# Patient Record
Sex: MALE | Race: WHITE | NOT HISPANIC OR LATINO | Employment: FULL TIME | URBAN - NONMETROPOLITAN AREA
[De-identification: names, ages, dates, MRNs, and addresses within clinical notes are randomized per-mention and may not be internally consistent; named-entity substitution may affect disease eponyms.]

---

## 2023-05-03 ENCOUNTER — APPOINTMENT (OUTPATIENT)
Dept: PRIMARY CARE | Facility: CLINIC | Age: 53
End: 2023-05-03

## 2023-05-03 PROCEDURE — FAA03 PR FAA EXAM SPECIAL ISSUANCE: Performed by: FAMILY MEDICINE

## 2023-05-03 PROCEDURE — FAA01 PR FAA1 EXAM CLASS I II III: Performed by: FAMILY MEDICINE

## 2023-10-02 ENCOUNTER — HOSPITAL ENCOUNTER (OUTPATIENT)
Dept: RADIOLOGY | Facility: HOSPITAL | Age: 53
Discharge: HOME | End: 2023-10-02
Payer: COMMERCIAL

## 2023-10-02 DIAGNOSIS — Z87.442 PERSONAL HISTORY OF URINARY CALCULI: ICD-10-CM

## 2023-10-02 PROCEDURE — 74018 RADEX ABDOMEN 1 VIEW: CPT | Mod: FY

## 2023-10-02 PROCEDURE — 74018 RADEX ABDOMEN 1 VIEW: CPT

## 2023-10-11 ENCOUNTER — LAB (OUTPATIENT)
Dept: LAB | Facility: LAB | Age: 53
End: 2023-10-11
Payer: COMMERCIAL

## 2023-10-11 DIAGNOSIS — N40.0 BENIGN PROSTATIC HYPERPLASIA WITHOUT LOWER URINARY TRACT SYMPTOMS: Primary | ICD-10-CM

## 2023-10-11 LAB — PSA SERPL-MCNC: 0.25 NG/ML

## 2023-10-11 PROCEDURE — 36415 COLL VENOUS BLD VENIPUNCTURE: CPT

## 2023-10-11 PROCEDURE — 84153 ASSAY OF PSA TOTAL: CPT

## 2023-11-08 ENCOUNTER — APPOINTMENT (OUTPATIENT)
Dept: PRIMARY CARE | Facility: CLINIC | Age: 53
End: 2023-11-08

## 2023-11-08 PROCEDURE — FAA03 PR FAA EXAM SPECIAL ISSUANCE: Performed by: FAMILY MEDICINE

## 2023-11-08 PROCEDURE — FAA01 PR FAA1 EXAM CLASS I II III: Performed by: FAMILY MEDICINE

## 2023-11-08 PROCEDURE — FAAEKG PR FAA EXAM EKG COMPONENT: Performed by: FAMILY MEDICINE

## 2023-11-25 DIAGNOSIS — D69.3 IDIOPATHIC THROMBOCYTOPENIC PURPURA (MULTI): Primary | ICD-10-CM

## 2023-11-25 NOTE — PROGRESS NOTES
Mahesh Deras is a 53 y.o. year old male patient who had a patient care encounter with Dr. Fuentes on 9/11/23 for ongoing management of Idiopathic thrombocytopenic purpura (CMS/HCC) [D69.3] .  Mahesh is currently being treated with eltrombopag.    Labs from 9/11/23:  WBC 5.1, ANC 2.83, H/H 16.7/49.0, PLTs 311;  CMP stable.  No evidence of lymphoma relapse.  PLT count normal.  Continue current therapy.    Per collaborative practice agreement with Dr. Fuentes, on 11/25/23 I refilled eltrombopag 50 mg PO once daily for 5 days per week, then off 2 days;  35 day continuous cycle.  Qty # 30 with 2 refills.  The prescription was sent to Dr. Fuentes for approval, pending subsequent electronic transmission to [facility].    Next FUV is 3/11/24.      Matthew Spann, [facility], [place], BCOP  Clinical Pharmacist Specialist - Ambulatory Oncology

## 2024-01-10 ENCOUNTER — SPECIALTY PHARMACY (OUTPATIENT)
Dept: PHARMACY | Facility: CLINIC | Age: 54
End: 2024-01-10

## 2024-01-12 ENCOUNTER — TELEPHONE (OUTPATIENT)
Dept: HEMATOLOGY/ONCOLOGY | Facility: CLINIC | Age: 54
End: 2024-01-12
Payer: COMMERCIAL

## 2024-03-11 ENCOUNTER — OFFICE VISIT (OUTPATIENT)
Dept: HEMATOLOGY/ONCOLOGY | Facility: CLINIC | Age: 54
End: 2024-03-11
Payer: COMMERCIAL

## 2024-03-11 ENCOUNTER — APPOINTMENT (OUTPATIENT)
Dept: HEMATOLOGY/ONCOLOGY | Facility: CLINIC | Age: 54
End: 2024-03-11
Payer: COMMERCIAL

## 2024-03-11 ENCOUNTER — LAB (OUTPATIENT)
Dept: LAB | Facility: CLINIC | Age: 54
End: 2024-03-11
Payer: COMMERCIAL

## 2024-03-11 VITALS
BODY MASS INDEX: 24.64 KG/M2 | WEIGHT: 165.79 LBS | SYSTOLIC BLOOD PRESSURE: 132 MMHG | RESPIRATION RATE: 16 BRPM | TEMPERATURE: 97.5 F | HEART RATE: 83 BPM | OXYGEN SATURATION: 96 % | DIASTOLIC BLOOD PRESSURE: 81 MMHG

## 2024-03-11 DIAGNOSIS — D69.3 IDIOPATHIC THROMBOCYTOPENIC PURPURA (MULTI): ICD-10-CM

## 2024-03-11 DIAGNOSIS — C83.30 DIFFUSE LARGE B-CELL LYMPHOMA, UNSPECIFIED BODY REGION (MULTI): ICD-10-CM

## 2024-03-11 LAB
ALBUMIN SERPL BCP-MCNC: 5 G/DL (ref 3.4–5)
ALP SERPL-CCNC: 80 U/L (ref 33–120)
ALT SERPL W P-5'-P-CCNC: 26 U/L (ref 10–52)
ANION GAP SERPL CALC-SCNC: 14 MMOL/L (ref 10–20)
AST SERPL W P-5'-P-CCNC: 18 U/L (ref 9–39)
BASOPHILS # BLD AUTO: 0.04 X10*3/UL (ref 0–0.1)
BASOPHILS NFR BLD AUTO: 0.7 %
BILIRUB SERPL-MCNC: 0.8 MG/DL (ref 0–1.2)
BUN SERPL-MCNC: 14 MG/DL (ref 6–23)
CALCIUM SERPL-MCNC: 9.9 MG/DL (ref 8.6–10.3)
CHLORIDE SERPL-SCNC: 103 MMOL/L (ref 98–107)
CO2 SERPL-SCNC: 30 MMOL/L (ref 21–32)
CREAT SERPL-MCNC: 0.92 MG/DL (ref 0.5–1.3)
EGFRCR SERPLBLD CKD-EPI 2021: >90 ML/MIN/1.73M*2
EOSINOPHIL # BLD AUTO: 0.13 X10*3/UL (ref 0–0.7)
EOSINOPHIL NFR BLD AUTO: 2.2 %
ERYTHROCYTE [DISTWIDTH] IN BLOOD BY AUTOMATED COUNT: 12.4 % (ref 11.5–14.5)
GLUCOSE SERPL-MCNC: 102 MG/DL (ref 74–99)
HCT VFR BLD AUTO: 50.6 % (ref 41–52)
HGB BLD-MCNC: 17 G/DL (ref 13.5–17.5)
IMM GRANULOCYTES # BLD AUTO: 0.01 X10*3/UL (ref 0–0.7)
IMM GRANULOCYTES NFR BLD AUTO: 0.2 % (ref 0–0.9)
LDH SERPL L TO P-CCNC: 160 U/L (ref 84–246)
LYMPHOCYTES # BLD AUTO: 1.52 X10*3/UL (ref 1.2–4.8)
LYMPHOCYTES NFR BLD AUTO: 25.9 %
MCH RBC QN AUTO: 30.1 PG (ref 26–34)
MCHC RBC AUTO-ENTMCNC: 33.6 G/DL (ref 32–36)
MCV RBC AUTO: 90 FL (ref 80–100)
MONOCYTES # BLD AUTO: 0.59 X10*3/UL (ref 0.1–1)
MONOCYTES NFR BLD AUTO: 10.1 %
NEUTROPHILS # BLD AUTO: 3.57 X10*3/UL (ref 1.2–7.7)
NEUTROPHILS NFR BLD AUTO: 60.9 %
PLATELET # BLD AUTO: 359 X10*3/UL (ref 150–450)
POTASSIUM SERPL-SCNC: 4 MMOL/L (ref 3.5–5.3)
PROT SERPL-MCNC: 7.3 G/DL (ref 6.4–8.2)
RBC # BLD AUTO: 5.64 X10*6/UL (ref 4.5–5.9)
SODIUM SERPL-SCNC: 143 MMOL/L (ref 136–145)
WBC # BLD AUTO: 5.9 X10*3/UL (ref 4.4–11.3)

## 2024-03-11 PROCEDURE — 99214 OFFICE O/P EST MOD 30 MIN: CPT | Performed by: INTERNAL MEDICINE

## 2024-03-11 PROCEDURE — 80053 COMPREHEN METABOLIC PANEL: CPT

## 2024-03-11 PROCEDURE — 36415 COLL VENOUS BLD VENIPUNCTURE: CPT

## 2024-03-11 PROCEDURE — 83615 LACTATE (LD) (LDH) ENZYME: CPT

## 2024-03-11 PROCEDURE — 85025 COMPLETE CBC W/AUTO DIFF WBC: CPT

## 2024-03-11 ASSESSMENT — PAIN SCALES - GENERAL: PAINLEVEL: 0-NO PAIN

## 2024-03-11 NOTE — PROGRESS NOTES
Mahesh Deras is a 53 y.o. year old male patient who had a patient care encounter with Dr. Fuentes on 3/11/24 for ongoing management of his ITP and his B cell lymphoma (in remission).  Keon is currently being treated with eltrombopag for his ITP.    Results from last 7 days   Lab Units 03/11/24  0801   WBC AUTO x10*3/uL 5.9   HEMOGLOBIN g/dL 17.0   HEMATOCRIT % 50.6   PLATELETS AUTO x10*3/uL 359   NEUTROS PCT AUTO % 60.9   LYMPHS PCT AUTO % 25.9   MONOS PCT AUTO % 10.1   EOS PCT AUTO % 2.2      Results from last 7 days   Lab Units 03/11/24  0801   SODIUM mmol/L 143   POTASSIUM mmol/L 4.0   CHLORIDE mmol/L 103   CO2 mmol/L 30   BUN mg/dL 14   CREATININE mg/dL 0.92   CALCIUM mg/dL 9.9   PROTEIN TOTAL g/dL 7.3   BILIRUBIN TOTAL mg/dL 0.8   ALK PHOS U/L 80   ALT U/L 26   AST U/L 18   GLUCOSE mg/dL 102*      Labs from 3/11/24:  WBC 5.9, ANC 3.57, H/H 17.0/50.6, PLTs 359;  CMP stable;  .  Per RC, LFTs stable.      Per Dr. Fuentes, continue eltrombopag as is, no changes, in order to maintain PLTs in current range.    Per collaborative practice agreement with Dr. Fuentes, a little early on 3/11/24 I refilled eltrombopag 50 mg PO once daily for 5 days a week, off for 2 days per week, 42-day continuous cycle.  Qty # 30 with 4 refills.  The prescription was sent electronically to [facility].    RTC in 6 months.      Matthew Spann, [facility], MS, BCOP  Clinical Pharmacist Specialist - Ambulatory Oncology

## 2024-03-11 NOTE — PROGRESS NOTES
Patient ID: Mahesh Deras is a 53 y.o. male.  Referring Physician: No referring provider defined for this encounter.  Primary Care Provider: Ankush Gallegos MD  Visit Type: Follow Up      Subjective    HPI How was my bloodwork?    Review of Systems   Constitutional: Negative.    HENT:  Negative.     Eyes: Negative.    Respiratory: Negative.     Cardiovascular: Negative.    Gastrointestinal: Negative.    Endocrine: Negative.    Genitourinary: Negative.     Musculoskeletal: Negative.    Skin: Negative.    Neurological: Negative.    Hematological: Negative.    Psychiatric/Behavioral: Negative.          Objective   BSA: 1.91 meters squared  /81 (BP Location: Right arm)   Pulse 83   Temp 36.4 °C (97.5 °F) (Temporal)   Resp 16   Wt 75.2 kg (165 lb 12.6 oz)   SpO2 96%   BMI 24.64 kg/m²      has no past medical history on file.   has a past surgical history that includes Other surgical history (11/23/2021) and Other surgical history (11/23/2021).  No family history on file.  Oncology History    No history exists.       Mahesh Deras  has no history on file for tobacco use.  He  has no history on file for alcohol use.  He  has no history on file for drug use.    Physical Exam  Vitals reviewed.   Constitutional:       Appearance: Normal appearance.   HENT:      Head: Normocephalic.      Mouth/Throat:      Mouth: Mucous membranes are moist.   Eyes:      Extraocular Movements: Extraocular movements intact.      Pupils: Pupils are equal, round, and reactive to light.   Cardiovascular:      Rate and Rhythm: Normal rate and regular rhythm.      Heart sounds: Normal heart sounds.   Pulmonary:      Breath sounds: Normal breath sounds.   Abdominal:      General: Bowel sounds are normal.      Palpations: Abdomen is soft.   Musculoskeletal:         General: Normal range of motion.      Cervical back: Normal range of motion and neck supple.   Skin:     General: Skin is warm.   Neurological:      General: No focal  deficit present.      Mental Status: He is alert and oriented to person, place, and time.   Psychiatric:         Mood and Affect: Mood normal.         Behavior: Behavior normal.         WBC   Date/Time Value Ref Range Status   03/11/2024 08:01 AM 5.9 4.4 - 11.3 x10*3/uL Final   09/11/2023 09:34 AM 5.1 4.4 - 11.3 x10E9/L Final   03/06/2023 09:34 AM 5.0 4.4 - 11.3 x10E9/L Final   01/16/2023 09:07 AM 5.1 4.4 - 11.3 x10E9/L Final     nRBC   Date Value Ref Range Status   09/13/2021 0.0 0.0 - 0.0 /100 WBC Final   09/12/2021 0.0 0.0 - 0.0 /100 WBC Final   09/11/2021 0.0 0.0 - 0.0 /100 WBC Final     RBC   Date Value Ref Range Status   03/11/2024 5.64 4.50 - 5.90 x10*6/uL Final   09/11/2023 5.47 4.50 - 5.90 x10E12/L Final   03/06/2023 5.51 4.50 - 5.90 x10E12/L Final   01/16/2023 5.38 4.50 - 5.90 x10E12/L Final     Hemoglobin   Date Value Ref Range Status   03/11/2024 17.0 13.5 - 17.5 g/dL Final   09/11/2023 16.7 13.5 - 17.5 g/dL Final   03/06/2023 16.7 13.5 - 17.5 g/dL Final   01/16/2023 16.4 13.5 - 17.5 g/dL Final     Hematocrit   Date Value Ref Range Status   03/11/2024 50.6 41.0 - 52.0 % Final   09/11/2023 49.0 41.0 - 52.0 % Final   03/06/2023 49.7 41.0 - 52.0 % Final   01/16/2023 48.2 41.0 - 52.0 % Final     MCV   Date/Time Value Ref Range Status   03/11/2024 08:01 AM 90 80 - 100 fL Final   09/11/2023 09:34 AM 90 80 - 100 fL Final   03/06/2023 09:34 AM 90 80 - 100 fL Final   01/16/2023 09:07 AM 90 80 - 100 fL Final     MCH   Date/Time Value Ref Range Status   03/11/2024 08:01 AM 30.1 26.0 - 34.0 pg Final     MCHC   Date/Time Value Ref Range Status   03/11/2024 08:01 AM 33.6 32.0 - 36.0 g/dL Final   09/11/2023 09:34 AM 34.1 32.0 - 36.0 g/dL Final   03/06/2023 09:34 AM 33.6 32.0 - 36.0 g/dL Final   01/16/2023 09:07 AM 34.0 32.0 - 36.0 g/dL Final     RDW   Date/Time Value Ref Range Status   03/11/2024 08:01 AM 12.4 11.5 - 14.5 % Final   09/11/2023 09:34 AM 12.2 11.5 - 14.5 % Final   03/06/2023 09:34 AM 12.5 11.5 - 14.5 %  "Final   01/16/2023 09:07 AM 12.5 11.5 - 14.5 % Final     Platelets   Date/Time Value Ref Range Status   03/11/2024 08:01  150 - 450 x10*3/uL Final   09/11/2023 09:34  150 - 450 x10E9/L Final   03/06/2023 09:34  150 - 450 x10E9/L Final   01/16/2023 09:07  150 - 450 x10E9/L Final     No results found for: \"MPV\"  Neutrophils %   Date/Time Value Ref Range Status   03/11/2024 08:01 AM 60.9 40.0 - 80.0 % Final   09/11/2023 09:34 AM 55.6 40.0 - 80.0 % Final   03/06/2023 09:34 AM 55.9 40.0 - 80.0 % Final   01/16/2023 09:07 AM 59.0 40.0 - 80.0 % Final     Immature Granulocytes %, Automated   Date/Time Value Ref Range Status   03/11/2024 08:01 AM 0.2 0.0 - 0.9 % Final     Comment:     Immature Granulocyte Count (IG) includes promyelocytes, myelocytes and metamyelocytes but does not include bands. Percent differential counts (%) should be interpreted in the context of the absolute cell counts (cells/UL).   09/11/2023 09:34 AM 0.0 0.0 - 0.9 % Final     Comment:      Immature Granulocyte Count (IG) includes promyelocytes,    myelocytes and metamyelocytes but does not include bands.   Percent differential counts (%) should be interpreted in the   context of the absolute cell counts (cells/L).     03/06/2023 09:34 AM 0.2 0.0 - 0.9 % Final     Comment:      Immature Granulocyte Count (IG) includes promyelocytes,    myelocytes and metamyelocytes but does not include bands.   Percent differential counts (%) should be interpreted in the   context of the absolute cell counts (cells/L).     01/16/2023 09:07 AM 0.2 0.0 - 0.9 % Final     Comment:      Immature Granulocyte Count (IG) includes promyelocytes,    myelocytes and metamyelocytes but does not include bands.   Percent differential counts (%) should be interpreted in the   context of the absolute cell counts (cells/L).       Lymphocytes %   Date/Time Value Ref Range Status   03/11/2024 08:01 AM 25.9 13.0 - 44.0 % Final   09/11/2023 09:34 AM 30.1 13.0 - 44.0 " % Final   03/06/2023 09:34 AM 31.2 13.0 - 44.0 % Final   01/16/2023 09:07 AM 26.2 13.0 - 44.0 % Final   09/09/2021 11:37 PM 13.0 13.0 - 44.0 % Final     Monocytes %   Date/Time Value Ref Range Status   03/11/2024 08:01 AM 10.1 2.0 - 10.0 % Final   09/11/2023 09:34 AM 10.8 2.0 - 10.0 % Final   03/06/2023 09:34 AM 8.7 2.0 - 10.0 % Final   01/16/2023 09:07 AM 11.2 2.0 - 10.0 % Final   09/09/2021 11:37 PM 13.0 2.0 - 10.0 % Final     Eosinophils %   Date/Time Value Ref Range Status   03/11/2024 08:01 AM 2.2 0.0 - 6.0 % Final   09/11/2023 09:34 AM 2.9 0.0 - 6.0 % Final   03/06/2023 09:34 AM 3.4 0.0 - 6.0 % Final   01/16/2023 09:07 AM 2.6 0.0 - 6.0 % Final   09/09/2021 11:37 PM 1.0 0.0 - 6.0 % Final     Basophils %   Date/Time Value Ref Range Status   03/11/2024 08:01 AM 0.7 0.0 - 2.0 % Final   09/11/2023 09:34 AM 0.6 0.0 - 2.0 % Final   03/06/2023 09:34 AM 0.6 0.0 - 2.0 % Final   01/16/2023 09:07 AM 0.8 0.0 - 2.0 % Final   09/09/2021 11:37 PM 0.0 0.0 - 2.0 % Final     Neutrophils Absolute   Date/Time Value Ref Range Status   03/11/2024 08:01 AM 3.57 1.20 - 7.70 x10*3/uL Final     Comment:     Percent differential counts (%) should be interpreted in the context of the absolute cell counts (cells/uL).   09/11/2023 09:34 AM 2.83 1.20 - 7.70 x10E9/L Final   03/06/2023 09:34 AM 2.81 1.20 - 7.70 x10E9/L Final   01/16/2023 09:07 AM 2.99 1.20 - 7.70 x10E9/L Final     Immature Granulocytes Absolute, Automated   Date/Time Value Ref Range Status   03/11/2024 08:01 AM 0.01 0.00 - 0.70 x10*3/uL Final     Lymphocytes Absolute   Date/Time Value Ref Range Status   03/11/2024 08:01 AM 1.52 1.20 - 4.80 x10*3/uL Final   09/11/2023 09:34 AM 1.53 1.20 - 4.80 x10E9/L Final   03/06/2023 09:34 AM 1.57 1.20 - 4.80 x10E9/L Final   01/16/2023 09:07 AM 1.33 1.20 - 4.80 x10E9/L Final     Monocytes Absolute   Date/Time Value Ref Range Status   03/11/2024 08:01 AM 0.59 0.10 - 1.00 x10*3/uL Final   09/11/2023 09:34 AM 0.55 0.10 - 1.00 x10E9/L Final  "  03/06/2023 09:34 AM 0.44 0.10 - 1.00 x10E9/L Final   01/16/2023 09:07 AM 0.57 0.10 - 1.00 x10E9/L Final     Eosinophils Absolute   Date/Time Value Ref Range Status   03/11/2024 08:01 AM 0.13 0.00 - 0.70 x10*3/uL Final   09/11/2023 09:34 AM 0.15 0.00 - 0.70 x10E9/L Final   03/06/2023 09:34 AM 0.17 0.00 - 0.70 x10E9/L Final   01/16/2023 09:07 AM 0.13 0.00 - 0.70 x10E9/L Final   09/09/2021 11:37 PM 0.08 0.00 - 0.70 x10E9/L Final     Basophils Absolute   Date/Time Value Ref Range Status   03/11/2024 08:01 AM 0.04 0.00 - 0.10 x10*3/uL Final   09/11/2023 09:34 AM 0.03 0.00 - 0.10 x10E9/L Final   03/06/2023 09:34 AM 0.03 0.00 - 0.10 x10E9/L Final   01/16/2023 09:07 AM 0.04 0.00 - 0.10 x10E9/L Final   09/09/2021 11:37 PM 0.00 0.00 - 0.10 x10E9/L Final       No components found for: \"PT\"  aPTT   Date/Time Value Ref Range Status   09/11/2021 06:39 AM 28 25 - 35 sec Final     Comment:       THE APTT IS NO LONGER USED FOR MONITORING     UNFRACTIONATED HEPARIN THERAPY.    FOR MONITORING HEPARIN THERAPY,     USE THE HEPARIN ASSAY.     09/10/2021 06:50 AM 32 25 - 35 sec Final     Comment:       THE APTT IS NO LONGER USED FOR MONITORING     UNFRACTIONATED HEPARIN THERAPY.    FOR MONITORING HEPARIN THERAPY,     USE THE HEPARIN ASSAY.       Medication Documentation Review Audit       Reviewed by Lona Cortez MA (Medical Assistant) on 03/11/24 at 1535      Medication Order Taking? Sig Documenting Provider Last Dose Status   eltrombopag (Promacta) 50 mg tablet [id number]  Take 1 table by mouth once daily for 5 days a week on an empty stomach.  Then off for 2 days a week as directed. 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9, hgb 17, hematocrit 50.6, ANC 3570, creatinine 0.92, calcium 9.9, AST 18, ALT 26, LDH  -will continue to follow him Q6 months     Problem List Items Addressed This Visit    None  Visit Diagnoses         Codes    Diffuse large B-cell lymphoma, unspecified body region (CMS/HCC)     C83.30    Relevant Orders    CBC and Auto Differential (Completed)    Comprehensive Metabolic Panel (Completed)    Lactate dehydrogenase (Completed)    Clinic Appointment Request Follow Up; DUNG FUENTES; [facility]    CBC and Auto Differential    Comprehensive metabolic panel    Lactate dehydrogenase    Idiopathic thrombocytopenic purpura (CMS/HCC)     D69.3    Relevant Medications    eltrombopag olamine (Promacta) 50 mg tablet                 Dung Fuentes MD

## 2024-03-19 PROBLEM — C83.30 DIFFUSE LARGE B-CELL LYMPHOMA (MULTI): Status: ACTIVE | Noted: 2024-03-19

## 2024-03-19 PROBLEM — D69.3 IDIOPATHIC THROMBOCYTOPENIC PURPURA (MULTI): Status: ACTIVE | Noted: 2024-03-19

## 2024-03-19 ASSESSMENT — ENCOUNTER SYMPTOMS
PSYCHIATRIC NEGATIVE: 1
RESPIRATORY NEGATIVE: 1
ENDOCRINE NEGATIVE: 1
CONSTITUTIONAL NEGATIVE: 1
NEUROLOGICAL NEGATIVE: 1
HEMATOLOGIC/LYMPHATIC NEGATIVE: 1
CARDIOVASCULAR NEGATIVE: 1
MUSCULOSKELETAL NEGATIVE: 1
EYES NEGATIVE: 1
GASTROINTESTINAL NEGATIVE: 1

## 2024-04-02 ENCOUNTER — HOSPITAL ENCOUNTER (OUTPATIENT)
Dept: RADIOLOGY | Facility: HOSPITAL | Age: 54
Discharge: HOME | End: 2024-04-02
Payer: COMMERCIAL

## 2024-04-02 DIAGNOSIS — N20.0 CALCULUS OF KIDNEY: ICD-10-CM

## 2024-04-02 PROCEDURE — 74018 RADEX ABDOMEN 1 VIEW: CPT

## 2024-04-02 PROCEDURE — 74018 RADEX ABDOMEN 1 VIEW: CPT | Performed by: STUDENT IN AN ORGANIZED HEALTH CARE EDUCATION/TRAINING PROGRAM

## 2024-05-08 ENCOUNTER — APPOINTMENT (OUTPATIENT)
Dept: PRIMARY CARE | Facility: CLINIC | Age: 54
End: 2024-05-08

## 2024-05-09 ENCOUNTER — DOCUMENTATION (OUTPATIENT)
Dept: PRIMARY CARE | Facility: CLINIC | Age: 54
End: 2024-05-09

## 2024-05-09 PROCEDURE — FAA01 PR FAA1 EXAM CLASS I II III: Performed by: FAMILY MEDICINE

## 2024-05-09 PROCEDURE — FAA03 PR FAA EXAM SPECIAL ISSUANCE: Performed by: FAMILY MEDICINE

## 2024-05-10 ENCOUNTER — TELEPHONE (OUTPATIENT)
Dept: PRIMARY CARE | Facility: CLINIC | Age: 54
End: 2024-05-10
Payer: COMMERCIAL

## 2024-05-10 NOTE — TELEPHONE ENCOUNTER
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

## 2024-09-13 ENCOUNTER — LAB (OUTPATIENT)
Dept: LAB | Facility: CLINIC | Age: 54
End: 2024-09-13
Payer: COMMERCIAL

## 2024-09-13 DIAGNOSIS — C83.30 DIFFUSE LARGE B-CELL LYMPHOMA, UNSPECIFIED BODY REGION (MULTI): ICD-10-CM

## 2024-09-13 LAB
ALBUMIN SERPL BCP-MCNC: 4.8 G/DL (ref 3.4–5)
ALP SERPL-CCNC: 76 U/L (ref 33–120)
ALT SERPL W P-5'-P-CCNC: 22 U/L (ref 10–52)
ANION GAP SERPL CALC-SCNC: 11 MMOL/L (ref 10–20)
AST SERPL W P-5'-P-CCNC: 20 U/L (ref 9–39)
BASOPHILS # BLD AUTO: 0.03 X10*3/UL (ref 0–0.1)
BASOPHILS NFR BLD AUTO: 0.6 %
BILIRUB SERPL-MCNC: 0.6 MG/DL (ref 0–1.2)
BUN SERPL-MCNC: 15 MG/DL (ref 6–23)
CALCIUM SERPL-MCNC: 9.5 MG/DL (ref 8.6–10.3)
CHLORIDE SERPL-SCNC: 104 MMOL/L (ref 98–107)
CO2 SERPL-SCNC: 31 MMOL/L (ref 21–32)
CREAT SERPL-MCNC: 0.9 MG/DL (ref 0.5–1.3)
EGFRCR SERPLBLD CKD-EPI 2021: >90 ML/MIN/1.73M*2
EOSINOPHIL # BLD AUTO: 0.16 X10*3/UL (ref 0–0.7)
EOSINOPHIL NFR BLD AUTO: 3 %
ERYTHROCYTE [DISTWIDTH] IN BLOOD BY AUTOMATED COUNT: 12.3 % (ref 11.5–14.5)
GLUCOSE SERPL-MCNC: 86 MG/DL (ref 74–99)
HCT VFR BLD AUTO: 47.8 % (ref 41–52)
HGB BLD-MCNC: 16 G/DL (ref 13.5–17.5)
IMM GRANULOCYTES # BLD AUTO: 0.01 X10*3/UL (ref 0–0.7)
IMM GRANULOCYTES NFR BLD AUTO: 0.2 % (ref 0–0.9)
LDH SERPL L TO P-CCNC: 141 U/L (ref 84–246)
LYMPHOCYTES # BLD AUTO: 1.37 X10*3/UL (ref 1.2–4.8)
LYMPHOCYTES NFR BLD AUTO: 25.6 %
MCH RBC QN AUTO: 29.9 PG (ref 26–34)
MCHC RBC AUTO-ENTMCNC: 33.5 G/DL (ref 32–36)
MCV RBC AUTO: 89 FL (ref 80–100)
MONOCYTES # BLD AUTO: 0.64 X10*3/UL (ref 0.1–1)
MONOCYTES NFR BLD AUTO: 12 %
NEUTROPHILS # BLD AUTO: 3.14 X10*3/UL (ref 1.2–7.7)
NEUTROPHILS NFR BLD AUTO: 58.6 %
PLATELET # BLD AUTO: 355 X10*3/UL (ref 150–450)
POTASSIUM SERPL-SCNC: 4.1 MMOL/L (ref 3.5–5.3)
PROT SERPL-MCNC: 6.8 G/DL (ref 6.4–8.2)
RBC # BLD AUTO: 5.35 X10*6/UL (ref 4.5–5.9)
SODIUM SERPL-SCNC: 142 MMOL/L (ref 136–145)
WBC # BLD AUTO: 5.4 X10*3/UL (ref 4.4–11.3)

## 2024-09-13 PROCEDURE — 85025 COMPLETE CBC W/AUTO DIFF WBC: CPT

## 2024-09-13 PROCEDURE — 36415 COLL VENOUS BLD VENIPUNCTURE: CPT

## 2024-09-13 PROCEDURE — 83615 LACTATE (LD) (LDH) ENZYME: CPT

## 2024-09-13 PROCEDURE — 80053 COMPREHEN METABOLIC PANEL: CPT

## 2024-09-18 ENCOUNTER — OFFICE VISIT (OUTPATIENT)
Dept: HEMATOLOGY/ONCOLOGY | Facility: CLINIC | Age: 54
End: 2024-09-18
Payer: COMMERCIAL

## 2024-09-18 VITALS
OXYGEN SATURATION: 98 % | DIASTOLIC BLOOD PRESSURE: 88 MMHG | HEART RATE: 76 BPM | TEMPERATURE: 97.5 F | WEIGHT: 167.99 LBS | RESPIRATION RATE: 16 BRPM | BODY MASS INDEX: 24.97 KG/M2 | SYSTOLIC BLOOD PRESSURE: 144 MMHG

## 2024-09-18 DIAGNOSIS — C83.30 DIFFUSE LARGE B-CELL LYMPHOMA, UNSPECIFIED BODY REGION (MULTI): ICD-10-CM

## 2024-09-18 DIAGNOSIS — D69.3 IDIOPATHIC THROMBOCYTOPENIC PURPURA (MULTI): Primary | ICD-10-CM

## 2024-09-18 PROCEDURE — 99214 OFFICE O/P EST MOD 30 MIN: CPT | Performed by: INTERNAL MEDICINE

## 2024-09-18 ASSESSMENT — PAIN SCALES - GENERAL: PAINLEVEL: 0-NO PAIN

## 2024-09-18 NOTE — PROGRESS NOTES
Patient ID: Mahesh Deras is a 53 y.o. male.  Referring Physician: Dung Fuentes MD  [id number]  [address]  [place] [address]  Primary Care Provider: Ankush Gallegos MD  Visit Type: Follow Up      Subjective    HPI I am doing okay    Review of Systems   Constitutional: Negative.    HENT:  Negative.     Eyes: Negative.    Respiratory: Negative.     Cardiovascular: Negative.    Gastrointestinal: Negative.    Endocrine: Negative.    Genitourinary: Negative.     Musculoskeletal: Negative.    Skin: Negative.    Neurological: Negative.    Hematological: Negative.    Psychiatric/Behavioral: Negative.          Objective   BSA: 1.92 meters squared  /88 (BP Location: Right arm, Patient Position: Sitting, BP Cuff Size: Adult)   Pulse 76   Temp 36.4 °C (97.5 °F)   Resp 16   Wt 76.2 kg (167 lb 15.9 oz)   SpO2 98%   BMI 24.97 kg/m²      has no past medical history on file.   has a past surgical history that includes Other surgical history (11/23/2021) and Other surgical history (11/23/2021).  No family history on file.  Oncology History    No history exists.       Mahesh Deras  has no history on file for tobacco use.  He  has no history on file for alcohol use.  He  has no history on file for drug use.    Physical Exam  Vitals reviewed.   Constitutional:       Appearance: Normal appearance.   HENT:      Head: Normocephalic.      Mouth/Throat:      Mouth: Mucous membranes are moist.   Eyes:      Extraocular Movements: Extraocular movements intact.      Pupils: Pupils are equal, round, and reactive to light.   Cardiovascular:      Rate and Rhythm: Normal rate and regular rhythm.      Pulses: Normal pulses.      Heart sounds: Normal heart sounds.   Pulmonary:      Effort: Pulmonary effort is normal.      Breath sounds: Normal breath sounds.   Abdominal:      General: Bowel sounds are normal.      Palpations: Abdomen is soft.   Musculoskeletal:         General: Normal range of motion.      Cervical  back: Normal range of motion and neck supple.   Skin:     General: Skin is warm.   Neurological:      General: No focal deficit present.      Mental Status: He is alert and oriented to person, place, and time.   Psychiatric:         Mood and Affect: Mood normal.         Behavior: Behavior normal.         WBC   Date/Time Value Ref Range Status   09/13/2024 11:05 AM 5.4 4.4 - 11.3 x10*3/uL Final   03/11/2024 08:01 AM 5.9 4.4 - 11.3 x10*3/uL Final   09/11/2023 09:34 AM 5.1 4.4 - 11.3 x10E9/L Final   03/06/2023 09:34 AM 5.0 4.4 - 11.3 x10E9/L Final   01/16/2023 09:07 AM 5.1 4.4 - 11.3 x10E9/L Final     nRBC   Date Value Ref Range Status   09/13/2021 0.0 0.0 - 0.0 /100 WBC Final   09/12/2021 0.0 0.0 - 0.0 /100 WBC Final   09/11/2021 0.0 0.0 - 0.0 /100 WBC Final     RBC   Date Value Ref Range Status   09/13/2024 5.35 4.50 - 5.90 x10*6/uL Final   03/11/2024 5.64 4.50 - 5.90 x10*6/uL Final   09/11/2023 5.47 4.50 - 5.90 x10E12/L Final   03/06/2023 5.51 4.50 - 5.90 x10E12/L Final   01/16/2023 5.38 4.50 - 5.90 x10E12/L Final     Hemoglobin   Date Value Ref Range Status   09/13/2024 16.0 13.5 - 17.5 g/dL Final   03/11/2024 17.0 13.5 - 17.5 g/dL Final   09/11/2023 16.7 13.5 - 17.5 g/dL Final   03/06/2023 16.7 13.5 - 17.5 g/dL Final   01/16/2023 16.4 13.5 - 17.5 g/dL Final     Hematocrit   Date Value Ref Range Status   09/13/2024 47.8 41.0 - 52.0 % Final   03/11/2024 50.6 41.0 - 52.0 % Final   09/11/2023 49.0 41.0 - 52.0 % Final   03/06/2023 49.7 41.0 - 52.0 % Final   01/16/2023 48.2 41.0 - 52.0 % Final     MCV   Date/Time Value Ref Range Status   09/13/2024 11:05 AM 89 80 - 100 fL Final   03/11/2024 08:01 AM 90 80 - 100 fL Final   09/11/2023 09:34 AM 90 80 - 100 fL Final   03/06/2023 09:34 AM 90 80 - 100 fL Final   01/16/2023 09:07 AM 90 80 - 100 fL Final     MCH   Date/Time Value Ref Range Status   09/13/2024 11:05 AM 29.9 26.0 - 34.0 pg Final   03/11/2024 08:01 AM 30.1 26.0 - 34.0 pg Final     MCHC   Date/Time Value Ref Range  "Status   09/13/2024 11:05 AM 33.5 32.0 - 36.0 g/dL Final   03/11/2024 08:01 AM 33.6 32.0 - 36.0 g/dL Final   09/11/2023 09:34 AM 34.1 32.0 - 36.0 g/dL Final   03/06/2023 09:34 AM 33.6 32.0 - 36.0 g/dL Final   01/16/2023 09:07 AM 34.0 32.0 - 36.0 g/dL Final     RDW   Date/Time Value Ref Range Status   09/13/2024 11:05 AM 12.3 11.5 - 14.5 % Final   03/11/2024 08:01 AM 12.4 11.5 - 14.5 % Final   09/11/2023 09:34 AM 12.2 11.5 - 14.5 % Final   03/06/2023 09:34 AM 12.5 11.5 - 14.5 % Final   01/16/2023 09:07 AM 12.5 11.5 - 14.5 % Final     Platelets   Date/Time Value Ref Range Status   09/13/2024 11:05  150 - 450 x10*3/uL Final   03/11/2024 08:01  150 - 450 x10*3/uL Final   09/11/2023 09:34  150 - 450 x10E9/L Final   03/06/2023 09:34  150 - 450 x10E9/L Final   01/16/2023 09:07  150 - 450 x10E9/L Final     No results found for: \"MPV\"  Neutrophils %   Date/Time Value Ref Range Status   09/13/2024 11:05 AM 58.6 40.0 - 80.0 % Final   03/11/2024 08:01 AM 60.9 40.0 - 80.0 % Final   09/11/2023 09:34 AM 55.6 40.0 - 80.0 % Final   03/06/2023 09:34 AM 55.9 40.0 - 80.0 % Final   01/16/2023 09:07 AM 59.0 40.0 - 80.0 % Final     Immature Granulocytes %, Automated   Date/Time Value Ref Range Status   09/13/2024 11:05 AM 0.2 0.0 - 0.9 % Final     Comment:     Immature Granulocyte Count (IG) includes promyelocytes, myelocytes and metamyelocytes but does not include bands. Percent differential counts (%) should be interpreted in the context of the absolute cell counts (cells/UL).   03/11/2024 08:01 AM 0.2 0.0 - 0.9 % Final     Comment:     Immature Granulocyte Count (IG) includes promyelocytes, myelocytes and metamyelocytes but does not include bands. Percent differential counts (%) should be interpreted in the context of the absolute cell counts (cells/UL).   09/11/2023 09:34 AM 0.0 0.0 - 0.9 % Final     Comment:      Immature Granulocyte Count (IG) includes promyelocytes,    myelocytes and metamyelocytes but " does not include bands.   Percent differential counts (%) should be interpreted in the   context of the absolute cell counts (cells/L).     03/06/2023 09:34 AM 0.2 0.0 - 0.9 % Final     Comment:      Immature Granulocyte Count (IG) includes promyelocytes,    myelocytes and metamyelocytes but does not include bands.   Percent differential counts (%) should be interpreted in the   context of the absolute cell counts (cells/L).     01/16/2023 09:07 AM 0.2 0.0 - 0.9 % Final     Comment:      Immature Granulocyte Count (IG) includes promyelocytes,    myelocytes and metamyelocytes but does not include bands.   Percent differential counts (%) should be interpreted in the   context of the absolute cell counts (cells/L).       Lymphocytes %   Date/Time Value Ref Range Status   09/13/2024 11:05 AM 25.6 13.0 - 44.0 % Final   03/11/2024 08:01 AM 25.9 13.0 - 44.0 % Final   09/11/2023 09:34 AM 30.1 13.0 - 44.0 % Final   03/06/2023 09:34 AM 31.2 13.0 - 44.0 % Final   01/16/2023 09:07 AM 26.2 13.0 - 44.0 % Final   09/09/2021 11:37 PM 13.0 13.0 - 44.0 % Final     Monocytes %   Date/Time Value Ref Range Status   09/13/2024 11:05 AM 12.0 2.0 - 10.0 % Final   03/11/2024 08:01 AM 10.1 2.0 - 10.0 % Final   09/11/2023 09:34 AM 10.8 2.0 - 10.0 % Final   03/06/2023 09:34 AM 8.7 2.0 - 10.0 % Final   01/16/2023 09:07 AM 11.2 2.0 - 10.0 % Final   09/09/2021 11:37 PM 13.0 2.0 - 10.0 % Final     Eosinophils %   Date/Time Value Ref Range Status   09/13/2024 11:05 AM 3.0 0.0 - 6.0 % Final   03/11/2024 08:01 AM 2.2 0.0 - 6.0 % Final   09/11/2023 09:34 AM 2.9 0.0 - 6.0 % Final   03/06/2023 09:34 AM 3.4 0.0 - 6.0 % Final   01/16/2023 09:07 AM 2.6 0.0 - 6.0 % Final   09/09/2021 11:37 PM 1.0 0.0 - 6.0 % Final     Basophils %   Date/Time Value Ref Range Status   09/13/2024 11:05 AM 0.6 0.0 - 2.0 % Final   03/11/2024 08:01 AM 0.7 0.0 - 2.0 % Final   09/11/2023 09:34 AM 0.6 0.0 - 2.0 % Final   03/06/2023 09:34 AM 0.6 0.0 - 2.0 % Final   01/16/2023 09:07  AM 0.8 0.0 - 2.0 % Final   09/09/2021 11:37 PM 0.0 0.0 - 2.0 % Final     Neutrophils Absolute   Date/Time Value Ref Range Status   09/13/2024 11:05 AM 3.14 1.20 - 7.70 x10*3/uL Final     Comment:     Percent differential counts (%) should be interpreted in the context of the absolute cell counts (cells/uL).   03/11/2024 08:01 AM 3.57 1.20 - 7.70 x10*3/uL Final     Comment:     Percent differential counts (%) should be interpreted in the context of the absolute cell counts (cells/uL).   09/11/2023 09:34 AM 2.83 1.20 - 7.70 x10E9/L Final   03/06/2023 09:34 AM 2.81 1.20 - 7.70 x10E9/L Final   01/16/2023 09:07 AM 2.99 1.20 - 7.70 x10E9/L Final     Immature Granulocytes Absolute, Automated   Date/Time Value Ref Range Status   09/13/2024 11:05 AM 0.01 0.00 - 0.70 x10*3/uL Final   03/11/2024 08:01 AM 0.01 0.00 - 0.70 x10*3/uL Final     Lymphocytes Absolute   Date/Time Value Ref Range Status   09/13/2024 11:05 AM 1.37 1.20 - 4.80 x10*3/uL Final   03/11/2024 08:01 AM 1.52 1.20 - 4.80 x10*3/uL Final   09/11/2023 09:34 AM 1.53 1.20 - 4.80 x10E9/L Final   03/06/2023 09:34 AM 1.57 1.20 - 4.80 x10E9/L Final   01/16/2023 09:07 AM 1.33 1.20 - 4.80 x10E9/L Final     Monocytes Absolute   Date/Time Value Ref Range Status   09/13/2024 11:05 AM 0.64 0.10 - 1.00 x10*3/uL Final   03/11/2024 08:01 AM 0.59 0.10 - 1.00 x10*3/uL Final   09/11/2023 09:34 AM 0.55 0.10 - 1.00 x10E9/L Final   03/06/2023 09:34 AM 0.44 0.10 - 1.00 x10E9/L Final   01/16/2023 09:07 AM 0.57 0.10 - 1.00 x10E9/L Final     Eosinophils Absolute   Date/Time Value Ref Range Status   09/13/2024 11:05 AM 0.16 0.00 - 0.70 x10*3/uL Final   03/11/2024 08:01 AM 0.13 0.00 - 0.70 x10*3/uL Final   09/11/2023 09:34 AM 0.15 0.00 - 0.70 x10E9/L Final   03/06/2023 09:34 AM 0.17 0.00 - 0.70 x10E9/L Final   01/16/2023 09:07 AM 0.13 0.00 - 0.70 x10E9/L Final   09/09/2021 11:37 PM 0.08 0.00 - 0.70 x10E9/L Final     Basophils Absolute   Date/Time Value Ref Range Status   09/13/2024 11:05 AM  "0.03 0.00 - 0.10 x10*3/uL Final   03/11/2024 08:01 AM 0.04 0.00 - 0.10 x10*3/uL Final   09/11/2023 09:34 AM 0.03 0.00 - 0.10 x10E9/L Final   03/06/2023 09:34 AM 0.03 0.00 - 0.10 x10E9/L Final   01/16/2023 09:07 AM 0.04 0.00 - 0.10 x10E9/L Final   09/09/2021 11:37 PM 0.00 0.00 - 0.10 x10E9/L Final       No components found for: \"PT\"  aPTT   Date/Time Value Ref Range Status   09/11/2021 06:39 AM 28 25 - 35 sec Final     Comment:       THE APTT IS NO LONGER USED FOR MONITORING     UNFRACTIONATED HEPARIN THERAPY.    FOR MONITORING HEPARIN THERAPY,     USE THE HEPARIN ASSAY.     09/10/2021 06:50 AM 32 25 - 35 sec Final     Comment:       THE APTT IS NO LONGER USED FOR MONITORING     UNFRACTIONATED HEPARIN THERAPY.    FOR MONITORING HEPARIN THERAPY,     USE THE HEPARIN ASSAY.       Medication Documentation Review Audit       Reviewed by Bev Newell MA (Medical Assistant) on 09/18/24 at 1051      Medication Order Taking? Sig Documenting Provider Last Dose Status   eltrombopag olamine (Promacta) 50 mg tablet [id number] Yes Take 1 table by mouth once daily for 5 days a week on an empty stomach.  Then off for 2 days a week as directed. 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

## 2024-09-19 ASSESSMENT — ENCOUNTER SYMPTOMS
MUSCULOSKELETAL NEGATIVE: 1
CONSTITUTIONAL NEGATIVE: 1
HEMATOLOGIC/LYMPHATIC NEGATIVE: 1
CARDIOVASCULAR NEGATIVE: 1
NEUROLOGICAL NEGATIVE: 1
GASTROINTESTINAL NEGATIVE: 1
PSYCHIATRIC NEGATIVE: 1
EYES NEGATIVE: 1
ENDOCRINE NEGATIVE: 1
RESPIRATORY NEGATIVE: 1

## 2024-10-02 ENCOUNTER — HOSPITAL ENCOUNTER (OUTPATIENT)
Dept: RADIOLOGY | Facility: HOSPITAL | Age: 54
Discharge: HOME | End: 2024-10-02
Payer: COMMERCIAL

## 2024-10-02 ENCOUNTER — LAB (OUTPATIENT)
Dept: LAB | Facility: LAB | Age: 54
End: 2024-10-02
Payer: COMMERCIAL

## 2024-10-02 DIAGNOSIS — N40.0 BENIGN PROSTATIC HYPERPLASIA WITHOUT LOWER URINARY TRACT SYMPTOMS: Primary | ICD-10-CM

## 2024-10-02 DIAGNOSIS — N20.0 CALCULUS OF KIDNEY: ICD-10-CM

## 2024-10-02 LAB — PSA SERPL-MCNC: 0.27 NG/ML

## 2024-10-02 PROCEDURE — 74018 RADEX ABDOMEN 1 VIEW: CPT

## 2024-10-02 PROCEDURE — 74018 RADEX ABDOMEN 1 VIEW: CPT | Performed by: RADIOLOGY

## 2024-10-02 PROCEDURE — 36415 COLL VENOUS BLD VENIPUNCTURE: CPT

## 2024-10-02 PROCEDURE — 84153 ASSAY OF PSA TOTAL: CPT

## 2024-10-20 DIAGNOSIS — D69.3 IDIOPATHIC THROMBOCYTOPENIC PURPURA (MULTI): ICD-10-CM

## 2024-10-20 NOTE — PROGRESS NOTES
Mahesh Deras is a 53 y.o. year old male patient who had a patient care encounter with Dr. Fuentes on 9/18/24 for ongoing management of his ITP;  also surveillance of prior B-cell lymphoma.  Keon is currently being treated with eltrombopag for his ITP.    Labs from 9/13/24:  WBC 5.4, 3.14, H/H 16.0/47.8, PLTs 355;  SCr 0.90, LFTs wnl, .  Per Dr. Fuentes, continue current eltrombopag therapy for ITP.  No evidence of DLBCL recurrence;  continue surveillance.    Per collaborative practice agreement with Dr. Fuentes, on 10/20/24 I refilled eltrombopag 50 mg O once daily x 5 days/week, off 2 days/week, 42-day cycle.  Qty # 30 with 5 refills.  The prescription was sent to [facility].    Next FUV is 3/19/24.      Matthew Spann RP, MS, BCOP  Clinical Pharmacist Specialist - Ambulatory Oncology

## 2024-11-05 ENCOUNTER — APPOINTMENT (OUTPATIENT)
Dept: PRIMARY CARE | Facility: CLINIC | Age: 54
End: 2024-11-05

## 2024-11-05 PROCEDURE — FAAEKG PR FAA EXAM EKG COMPONENT: Performed by: FAMILY MEDICINE

## 2024-11-05 PROCEDURE — FAA03 PR FAA EXAM SPECIAL ISSUANCE: Performed by: FAMILY MEDICINE

## 2024-11-05 PROCEDURE — FAA01 PR FAA1 EXAM CLASS I II III: Performed by: FAMILY MEDICINE

## 2025-03-17 ENCOUNTER — LAB (OUTPATIENT)
Dept: LAB | Facility: CLINIC | Age: 55
End: 2025-03-17
Payer: COMMERCIAL

## 2025-03-17 DIAGNOSIS — C83.30 DIFFUSE LARGE B-CELL LYMPHOMA, UNSPECIFIED BODY REGION (MULTI): ICD-10-CM

## 2025-03-17 LAB
ALBUMIN SERPL BCP-MCNC: 5.2 G/DL (ref 3.4–5)
ALP SERPL-CCNC: 87 U/L (ref 33–120)
ALT SERPL W P-5'-P-CCNC: 25 U/L (ref 10–52)
ANION GAP SERPL CALC-SCNC: 12 MMOL/L (ref 10–20)
AST SERPL W P-5'-P-CCNC: 21 U/L (ref 9–39)
BASOPHILS # BLD AUTO: 0.03 X10*3/UL (ref 0–0.1)
BASOPHILS NFR BLD AUTO: 0.6 %
BILIRUB SERPL-MCNC: 0.9 MG/DL (ref 0–1.2)
BUN SERPL-MCNC: 13 MG/DL (ref 6–23)
CALCIUM SERPL-MCNC: 9.6 MG/DL (ref 8.6–10.3)
CHLORIDE SERPL-SCNC: 103 MMOL/L (ref 98–107)
CO2 SERPL-SCNC: 30 MMOL/L (ref 21–32)
CREAT SERPL-MCNC: 0.92 MG/DL (ref 0.5–1.3)
EGFRCR SERPLBLD CKD-EPI 2021: >90 ML/MIN/1.73M*2
EOSINOPHIL # BLD AUTO: 0.12 X10*3/UL (ref 0–0.7)
EOSINOPHIL NFR BLD AUTO: 2.4 %
ERYTHROCYTE [DISTWIDTH] IN BLOOD BY AUTOMATED COUNT: 12.3 % (ref 11.5–14.5)
GLUCOSE SERPL-MCNC: 99 MG/DL (ref 74–99)
HCT VFR BLD AUTO: 50.1 % (ref 41–52)
HGB BLD-MCNC: 16.9 G/DL (ref 13.5–17.5)
IMM GRANULOCYTES # BLD AUTO: 0 X10*3/UL (ref 0–0.7)
IMM GRANULOCYTES NFR BLD AUTO: 0 % (ref 0–0.9)
LDH SERPL L TO P-CCNC: 177 U/L (ref 84–246)
LYMPHOCYTES # BLD AUTO: 1.17 X10*3/UL (ref 1.2–4.8)
LYMPHOCYTES NFR BLD AUTO: 23.4 %
MCH RBC QN AUTO: 30.2 PG (ref 26–34)
MCHC RBC AUTO-ENTMCNC: 33.7 G/DL (ref 32–36)
MCV RBC AUTO: 90 FL (ref 80–100)
MONOCYTES # BLD AUTO: 0.5 X10*3/UL (ref 0.1–1)
MONOCYTES NFR BLD AUTO: 10 %
NEUTROPHILS # BLD AUTO: 3.17 X10*3/UL (ref 1.2–7.7)
NEUTROPHILS NFR BLD AUTO: 63.6 %
PLATELET # BLD AUTO: 345 X10*3/UL (ref 150–450)
POTASSIUM SERPL-SCNC: 4 MMOL/L (ref 3.5–5.3)
PROT SERPL-MCNC: 7.4 G/DL (ref 6.4–8.2)
RBC # BLD AUTO: 5.6 X10*6/UL (ref 4.5–5.9)
SODIUM SERPL-SCNC: 141 MMOL/L (ref 136–145)
WBC # BLD AUTO: 5 X10*3/UL (ref 4.4–11.3)

## 2025-03-17 PROCEDURE — 85025 COMPLETE CBC W/AUTO DIFF WBC: CPT

## 2025-03-17 PROCEDURE — 36415 COLL VENOUS BLD VENIPUNCTURE: CPT

## 2025-03-17 PROCEDURE — 80053 COMPREHEN METABOLIC PANEL: CPT

## 2025-03-17 PROCEDURE — 83615 LACTATE (LD) (LDH) ENZYME: CPT

## 2025-03-19 ENCOUNTER — OFFICE VISIT (OUTPATIENT)
Dept: HEMATOLOGY/ONCOLOGY | Facility: CLINIC | Age: 55
End: 2025-03-19
Payer: COMMERCIAL

## 2025-03-19 VITALS
SYSTOLIC BLOOD PRESSURE: 128 MMHG | BODY MASS INDEX: 24.51 KG/M2 | DIASTOLIC BLOOD PRESSURE: 80 MMHG | HEART RATE: 78 BPM | RESPIRATION RATE: 16 BRPM | OXYGEN SATURATION: 98 % | WEIGHT: 164.9 LBS | TEMPERATURE: 97 F

## 2025-03-19 DIAGNOSIS — C83.30 DIFFUSE LARGE B-CELL LYMPHOMA, UNSPECIFIED BODY REGION (MULTI): ICD-10-CM

## 2025-03-19 DIAGNOSIS — D69.3 IDIOPATHIC THROMBOCYTOPENIC PURPURA (MULTI): Primary | ICD-10-CM

## 2025-03-19 PROCEDURE — 99214 OFFICE O/P EST MOD 30 MIN: CPT | Performed by: INTERNAL MEDICINE

## 2025-03-19 ASSESSMENT — PAIN SCALES - GENERAL: PAINLEVEL_OUTOF10: 0-NO PAIN

## 2025-03-19 NOTE — PROGRESS NOTES
Patient ID: Mahesh Deras is a 54 y.o. male.  Referring Physician: Dung Fuentes MD  [address]  [address]  [place] [address]  Primary Care Provider: Ankush Gallegos MD  Visit Type: Follow Up      Subjective    HPI I am doing okay    Review of Systems   Constitutional: Negative.    HENT:  Negative.     Eyes: Negative.    Respiratory: Negative.     Cardiovascular: Negative.    Gastrointestinal: Negative.    Endocrine: Negative.    Genitourinary: Negative.     Musculoskeletal: Negative.    Skin: Negative.    Neurological: Negative.    Hematological: Negative.    Psychiatric/Behavioral: Negative.          Objective   BSA: 1.91 meters squared  /80 (BP Location: Right arm)   Pulse 78   Temp 36.1 °C (97 °F) (Temporal)   Resp 16   Wt 74.8 kg (164 lb 14.5 oz)   SpO2 98%   BMI 24.51 kg/m²      has no past medical history on file.   has a past surgical history that includes Other surgical history (11/23/2021) and Other surgical history (11/23/2021).  No family history on file.  Oncology History    No history exists.       Mahesh Deras  has no history on file for tobacco use.  He  has no history on file for alcohol use.  He  has no history on file for drug use.    Physical Exam  Vitals reviewed.   Constitutional:       Appearance: Normal appearance.   HENT:      Head: Normocephalic.      Mouth/Throat:      Mouth: Mucous membranes are moist.   Eyes:      Extraocular Movements: Extraocular movements intact.      Pupils: Pupils are equal, round, and reactive to light.   Cardiovascular:      Rate and Rhythm: Normal rate and regular rhythm.      Pulses: Normal pulses.      Heart sounds: Normal heart sounds.   Pulmonary:      Effort: Pulmonary effort is normal.      Breath sounds: Normal breath sounds.   Abdominal:      General: Bowel sounds are normal.      Palpations: Abdomen is soft.   Musculoskeletal:         General: Normal range of motion.      Cervical back: Normal range of motion and neck  supple.   Skin:     General: Skin is warm.   Neurological:      General: No focal deficit present.      Mental Status: He is alert and oriented to person, place, and time.   Psychiatric:         Mood and Affect: Mood normal.         Behavior: Behavior normal.         WBC   Date/Time Value Ref Range Status   03/17/2025 09:27 AM 5.0 4.4 - 11.3 x10*3/uL Final   09/13/2024 11:05 AM 5.4 4.4 - 11.3 x10*3/uL Final   03/11/2024 08:01 AM 5.9 4.4 - 11.3 x10*3/uL Final     nRBC   Date Value Ref Range Status   09/13/2021 0.0 0.0 - 0.0 /100 WBC Final   09/12/2021 0.0 0.0 - 0.0 /100 WBC Final   09/11/2021 0.0 0.0 - 0.0 /100 WBC Final     RBC   Date Value Ref Range Status   03/17/2025 5.60 4.50 - 5.90 x10*6/uL Final   09/13/2024 5.35 4.50 - 5.90 x10*6/uL Final   03/11/2024 5.64 4.50 - 5.90 x10*6/uL Final     Hemoglobin   Date Value Ref Range Status   03/17/2025 16.9 13.5 - 17.5 g/dL Final   09/13/2024 16.0 13.5 - 17.5 g/dL Final   03/11/2024 17.0 13.5 - 17.5 g/dL Final     Hematocrit   Date Value Ref Range Status   03/17/2025 50.1 41.0 - 52.0 % Final   09/13/2024 47.8 41.0 - 52.0 % Final   03/11/2024 50.6 41.0 - 52.0 % Final     MCV   Date/Time Value Ref Range Status   03/17/2025 09:27 AM 90 80 - 100 fL Final   09/13/2024 11:05 AM 89 80 - 100 fL Final   03/11/2024 08:01 AM 90 80 - 100 fL Final     MCH   Date/Time Value Ref Range Status   03/17/2025 09:27 AM 30.2 26.0 - 34.0 pg Final   09/13/2024 11:05 AM 29.9 26.0 - 34.0 pg Final   03/11/2024 08:01 AM 30.1 26.0 - 34.0 pg Final     MCHC   Date/Time Value Ref Range Status   03/17/2025 09:27 AM 33.7 32.0 - 36.0 g/dL Final   09/13/2024 11:05 AM 33.5 32.0 - 36.0 g/dL Final   03/11/2024 08:01 AM 33.6 32.0 - 36.0 g/dL Final     RDW   Date/Time Value Ref Range Status   03/17/2025 09:27 AM 12.3 11.5 - 14.5 % Final   09/13/2024 11:05 AM 12.3 11.5 - 14.5 % Final   03/11/2024 08:01 AM 12.4 11.5 - 14.5 % Final     Platelets   Date/Time Value Ref Range Status   03/17/2025 09:27  150 - 450  "x10*3/uL Final   09/13/2024 11:05  150 - 450 x10*3/uL Final   03/11/2024 08:01  150 - 450 x10*3/uL Final     No results found for: \"MPV\"  Neutrophils %   Date/Time Value Ref Range Status   03/17/2025 09:27 AM 63.6 40.0 - 80.0 % Final   09/13/2024 11:05 AM 58.6 40.0 - 80.0 % Final   03/11/2024 08:01 AM 60.9 40.0 - 80.0 % Final     Immature Granulocytes %, Automated   Date/Time Value Ref Range Status   03/17/2025 09:27 AM 0.0 0.0 - 0.9 % Final     Comment:     Immature Granulocyte Count (IG) includes promyelocytes, myelocytes and metamyelocytes but does not include bands. Percent differential counts (%) should be interpreted in the context of the absolute cell counts (cells/UL).   09/13/2024 11:05 AM 0.2 0.0 - 0.9 % Final     Comment:     Immature Granulocyte Count (IG) includes promyelocytes, myelocytes and metamyelocytes but does not include bands. Percent differential counts (%) should be interpreted in the context of the absolute cell counts (cells/UL).   03/11/2024 08:01 AM 0.2 0.0 - 0.9 % Final     Comment:     Immature Granulocyte Count (IG) includes promyelocytes, myelocytes and metamyelocytes but does not include bands. Percent differential counts (%) should be interpreted in the context of the absolute cell counts (cells/UL).     Lymphocytes %   Date/Time Value Ref Range Status   03/17/2025 09:27 AM 23.4 13.0 - 44.0 % Final   09/13/2024 11:05 AM 25.6 13.0 - 44.0 % Final   03/11/2024 08:01 AM 25.9 13.0 - 44.0 % Final     Monocytes %   Date/Time Value Ref Range Status   03/17/2025 09:27 AM 10.0 2.0 - 10.0 % Final   09/13/2024 11:05 AM 12.0 2.0 - 10.0 % Final   03/11/2024 08:01 AM 10.1 2.0 - 10.0 % Final     Eosinophils %   Date/Time Value Ref Range Status   03/17/2025 09:27 AM 2.4 0.0 - 6.0 % Final   09/13/2024 11:05 AM 3.0 0.0 - 6.0 % Final   03/11/2024 08:01 AM 2.2 0.0 - 6.0 % Final     Basophils %   Date/Time Value Ref Range Status   03/17/2025 09:27 AM 0.6 0.0 - 2.0 % Final   09/13/2024 11:05 " "AM 0.6 0.0 - 2.0 % Final   03/11/2024 08:01 AM 0.7 0.0 - 2.0 % Final     Neutrophils Absolute   Date/Time Value Ref Range Status   03/17/2025 09:27 AM 3.17 1.20 - 7.70 x10*3/uL Final     Comment:     Percent differential counts (%) should be interpreted in the context of the absolute cell counts (cells/uL).   09/13/2024 11:05 AM 3.14 1.20 - 7.70 x10*3/uL Final     Comment:     Percent differential counts (%) should be interpreted in the context of the absolute cell counts (cells/uL).   03/11/2024 08:01 AM 3.57 1.20 - 7.70 x10*3/uL Final     Comment:     Percent differential counts (%) should be interpreted in the context of the absolute cell counts (cells/uL).     Immature Granulocytes Absolute, Automated   Date/Time Value Ref Range Status   03/17/2025 09:27 AM 0.00 0.00 - 0.70 x10*3/uL Final   09/13/2024 11:05 AM 0.01 0.00 - 0.70 x10*3/uL Final   03/11/2024 08:01 AM 0.01 0.00 - 0.70 x10*3/uL Final     Lymphocytes Absolute   Date/Time Value Ref Range Status   03/17/2025 09:27 AM 1.17 (L) 1.20 - 4.80 x10*3/uL Final   09/13/2024 11:05 AM 1.37 1.20 - 4.80 x10*3/uL Final   03/11/2024 08:01 AM 1.52 1.20 - 4.80 x10*3/uL Final     Monocytes Absolute   Date/Time Value Ref Range Status   03/17/2025 09:27 AM 0.50 0.10 - 1.00 x10*3/uL Final   09/13/2024 11:05 AM 0.64 0.10 - 1.00 x10*3/uL Final   03/11/2024 08:01 AM 0.59 0.10 - 1.00 x10*3/uL Final     Eosinophils Absolute   Date/Time Value Ref Range Status   03/17/2025 09:27 AM 0.12 0.00 - 0.70 x10*3/uL Final   09/13/2024 11:05 AM 0.16 0.00 - 0.70 x10*3/uL Final   03/11/2024 08:01 AM 0.13 0.00 - 0.70 x10*3/uL Final     Basophils Absolute   Date/Time Value Ref Range Status   03/17/2025 09:27 AM 0.03 0.00 - 0.10 x10*3/uL Final   09/13/2024 11:05 AM 0.03 0.00 - 0.10 x10*3/uL Final   03/11/2024 08:01 AM 0.04 0.00 - 0.10 x10*3/uL Final       No components found for: \"PT\"  aPTT   Date/Time Value Ref Range Status   09/11/2021 06:39 AM 28 25 - 35 sec Final     Comment:       THE APTT " IS NO LONGER USED FOR MONITORING     UNFRACTIONATED HEPARIN THERAPY.    FOR MONITORING HEPARIN THERAPY,     USE THE HEPARIN ASSAY.     09/10/2021 06:50 AM 32 25 - 35 sec Final     Comment:       THE APTT IS NO LONGER USED FOR MONITORING     UNFRACTIONATED HEPARIN THERAPY.    FOR MONITORING HEPARIN THERAPY,     USE THE HEPARIN ASSAY.       Medication Documentation Review Audit       Reviewed by Lona Cortez MA (Medical Assistant) on 03/19/25 at 1027      Medication Order Taking? Sig Documenting Provider Last Dose Status   eltrombopag olamine (Promacta) 50 mg tablet [id number] Yes Take 1 table by mouth once daily for 5 days a week on an empty stomach.  Then off for 2 days a week as directed. Dung Fuentes MD  Active                   Assessment/Plan    1) ITP  -in 9/2021 was hospitalized at [place] after developing acute bruising and petechiae after a spider bite  -his platelet count was in the single digit range--given IV decadron and IVGG x 2 doses  -has been on promacta 50 mg daily  -here for interval followup  -continues to fly ([employer]) for [employer]  -labs done on 3/17/2024 included CBC--plt count 345,000; alk phos 87, AST 21, ALT 25  -advised Keon to maintain promacta at 50 mg daily  -ITP remains in remission as long as he remains on promacta  -will continue to see him Q6 months     2) diffuse large cell lymphoma  -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  -completed 6 cycles of R squared-CHOP (rituxan + revlimid) by 7/2018  -completed standard surveillance  -remains in complete remission  -limited physical exam done today-no cervical, supraclavicular, axillary adenopathy  -reports no constitutional symptoms  -labs done on 3/17/2025 included CBC, COMP, LDH  -results reviewed--wbc 5.0, hgb 16.9, hematocrit 50.1, ANC 3170, creatinine 0.92, calcium 9.6,   -will continue to follow him Q6 months     Problem List Items Addressed This Visit             ICD-10-CM    Diffuse large  B-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

## 2025-03-21 ASSESSMENT — ENCOUNTER SYMPTOMS
GASTROINTESTINAL NEGATIVE: 1
PSYCHIATRIC NEGATIVE: 1
HEMATOLOGIC/LYMPHATIC NEGATIVE: 1
EYES NEGATIVE: 1
NEUROLOGICAL NEGATIVE: 1
RESPIRATORY NEGATIVE: 1
MUSCULOSKELETAL NEGATIVE: 1
CONSTITUTIONAL NEGATIVE: 1
CARDIOVASCULAR NEGATIVE: 1
ENDOCRINE NEGATIVE: 1

## 2025-03-24 ENCOUNTER — HOSPITAL ENCOUNTER (OUTPATIENT)
Dept: RADIOLOGY | Facility: HOSPITAL | Age: 55
Discharge: HOME | End: 2025-03-24
Payer: COMMERCIAL

## 2025-03-24 DIAGNOSIS — N20.0 CALCULUS OF KIDNEY: ICD-10-CM

## 2025-03-24 DIAGNOSIS — Z87.442 PERSONAL HISTORY OF URINARY CALCULI: ICD-10-CM

## 2025-03-24 PROCEDURE — 74018 RADEX ABDOMEN 1 VIEW: CPT | Performed by: RADIOLOGY

## 2025-03-24 PROCEDURE — 74018 RADEX ABDOMEN 1 VIEW: CPT

## 2025-05-06 ENCOUNTER — APPOINTMENT (OUTPATIENT)
Dept: PRIMARY CARE | Facility: CLINIC | Age: 55
End: 2025-05-06

## 2025-05-06 PROCEDURE — FAA01 PR FAA1 EXAM CLASS I II III: Performed by: FAMILY MEDICINE

## 2025-05-06 PROCEDURE — FAA03 PR FAA EXAM SPECIAL ISSUANCE: Performed by: FAMILY MEDICINE

## 2025-05-22 ENCOUNTER — TELEPHONE (OUTPATIENT)
Dept: HEMATOLOGY/ONCOLOGY | Facility: CLINIC | Age: 55
End: 2025-05-22
Payer: COMMERCIAL

## 2025-05-22 DIAGNOSIS — D69.3 IDIOPATHIC THROMBOCYTOPENIC PURPURA (MULTI): ICD-10-CM

## 2025-05-22 RX ORDER — ELTROMBOPAG OLAMINE 50 MG/1
TABLET, FILM COATED ORAL
Qty: 30 TABLET | Refills: 5 | Status: SHIPPED | OUTPATIENT
Start: 2025-05-22

## 2025-05-22 NOTE — TELEPHONE ENCOUNTER
[facility] Refill Management:    Medication(s) Requested: Promacta (FILIPE - medically necessary) 50 mg PO once daily    Date of Request: 05/22/25 3:29 PM    - Labs in last 3 months: Yes    Date: 3/17/25  - Office visit in last 3 months: Yes    Date: 3/19/25  - Changes in medications in last 3 months: No  - Actionable labs or dose adjustments requiring physician clarification: No    Next FUV scheduled for 9/17/25 w/ Dung Fuentes MD    Approved for refill under Consult Agreement: Yes    Comments: Continue current therapy.  Script marked as FILIPE to allow for continued dispensing as brand name Promacta, in light of recent availability of generic eltrombopag.    Matthew Spann, RPh, MS, BCOP  Clinical Pharmacy Specialist - Ambulatory Oncology

## 2025-05-22 NOTE — TELEPHONE ENCOUNTER
Patient has a question about Promacta prescription.  Got text about approval for generic form and would like to discuss reason/ staying on name brand.